# Patient Record
Sex: MALE | Race: BLACK OR AFRICAN AMERICAN | URBAN - METROPOLITAN AREA
[De-identification: names, ages, dates, MRNs, and addresses within clinical notes are randomized per-mention and may not be internally consistent; named-entity substitution may affect disease eponyms.]

---

## 2018-11-02 ENCOUNTER — OFFICE VISIT (OUTPATIENT)
Dept: FAMILY MEDICINE CLINIC | Age: 43
End: 2018-11-02

## 2018-11-02 VITALS
HEART RATE: 102 BPM | OXYGEN SATURATION: 99 % | WEIGHT: 280.6 LBS | BODY MASS INDEX: 38.01 KG/M2 | DIASTOLIC BLOOD PRESSURE: 90 MMHG | TEMPERATURE: 98.7 F | HEIGHT: 72 IN | RESPIRATION RATE: 19 BRPM | SYSTOLIC BLOOD PRESSURE: 130 MMHG

## 2018-11-02 DIAGNOSIS — M54.42 ACUTE LEFT-SIDED LOW BACK PAIN WITH LEFT-SIDED SCIATICA: Primary | ICD-10-CM

## 2018-11-02 RX ORDER — PREDNISONE 10 MG/1
TABLET ORAL
Qty: 21 TAB | Refills: 0 | Status: SHIPPED | OUTPATIENT
Start: 2018-11-02 | End: 2018-12-14 | Stop reason: ALTCHOICE

## 2018-11-02 RX ORDER — TRAMADOL HYDROCHLORIDE 50 MG/1
TABLET ORAL
Qty: 28 TAB | Refills: 0 | Status: SHIPPED | OUTPATIENT
Start: 2018-11-02 | End: 2018-12-14 | Stop reason: ALTCHOICE

## 2018-11-02 NOTE — PROGRESS NOTES
Chief Complaint   Patient presents with    Back Pain     back pain for the past three days. 1. Have you been to the ER, urgent care clinic since your last visit? Hospitalized since your last visit? No     2. Have you seen or consulted any other health care providers outside of the [address] [place] [address] since your last visit? Include any pap smears or colon screening.   No

## 2018-11-02 NOTE — LETTER
NOTIFICATION RETURN TO WORK / SCHOOL 
 
11/2/2018 11:25 AM 
 
Mr. Allyson Doyle [address] To Whom It May Concern: 
 
Allyson Doyle [facility] the [facility] He was here for an office visit 11/02/2018 If there are questions or concerns please have the patient contact our office. Sincerely, Lamin Souza NP

## 2018-11-02 NOTE — PROGRESS NOTES
Subjective:   Aminata Barrientos is a 37 y.o. male NEW patient presents to office today with 3 day complaint of back pain. Severity of pain 6-7/10  Location left lumbar   Context coughing three days ago after smoking a cigar and felt a pop lower back  Associated symptoms radiating left lower leg posterior to knee region   Triggers sitting and walking  Relieving factors rest   Review of Systems   Genitourinary: Negative. Musculoskeletal: Negative for falls, joint pain and neck pain. Neurological: Negative for tingling and focal weakness. PMH: reviewed medications and allergy lists and medical and family history. Occasional cigar smoker trying to quit  OBJECTIVE:  Awake and alert in no acute distress  Lungs clear throughout  S1 S2 RRR without ectopy or murmur auscultated. Musculoskeletal: point tenderness paraspinals lumbar left with positive straight leg bilaterally  Gait antalgic  Visit Vitals  /90 (BP 1 Location: Left arm, BP Patient Position: Sitting)   Pulse (!) 102   Temp 98.7 °F (37.1 °C) (Oral)   Resp 19   Ht 6' (1.829 m)   Wt 280 lb 9.6 oz (127.3 kg)   SpO2 99%   BMI 38.06 kg/m²     Diagnoses and all orders for this visit:    Acute left-sided low back pain with left-sided sciatica  -     predniSONE (STERAPRED DS) 10 mg dose pack; See administration instruction per 10mg dose pack, Normal, Disp-21 Tab, R-0  -     traMADol (ULTRAM) 50 mg tablet; May take 1 po every six hours for moderate to severe pain, Print, Disp-28 Tab, R-0      Reviewed risks and benefits and common side effects of new medication  General comfort measures  Anticipatory guidance  Health maintenance reviewed   I have discussed the diagnosis with the patient and the intended plan as seen in the above orders. The patient has received an after-visit summary and questions were answered concerning future plans. I have discussed medication side effects and warnings with the patient as well.     Follow-up Disposition:  Return for schedule Wellness .

## 2018-12-14 ENCOUNTER — OFFICE VISIT (OUTPATIENT)
Dept: FAMILY MEDICINE CLINIC | Age: 43
End: 2018-12-14

## 2018-12-14 VITALS
SYSTOLIC BLOOD PRESSURE: 127 MMHG | OXYGEN SATURATION: 100 % | HEART RATE: 62 BPM | BODY MASS INDEX: 38.6 KG/M2 | HEIGHT: 72 IN | TEMPERATURE: 97.6 F | DIASTOLIC BLOOD PRESSURE: 76 MMHG | WEIGHT: 285 LBS | RESPIRATION RATE: 20 BRPM

## 2018-12-14 DIAGNOSIS — Z13.220 SCREENING FOR HYPERCHOLESTEROLEMIA: ICD-10-CM

## 2018-12-14 DIAGNOSIS — Z13.1 SCREENING FOR DIABETES MELLITUS: ICD-10-CM

## 2018-12-14 DIAGNOSIS — Z00.00 GENERAL MEDICAL EXAM: Primary | ICD-10-CM

## 2018-12-14 DIAGNOSIS — Z12.5 SCREENING FOR PROSTATE CANCER: ICD-10-CM

## 2018-12-14 PROBLEM — E66.01 SEVERE OBESITY (HCC): Status: ACTIVE | Noted: 2018-12-14

## 2018-12-14 NOTE — PATIENT INSTRUCTIONS

## 2018-12-14 NOTE — PROGRESS NOTES
Chief Complaint   Patient presents with    Complete Physical    Shoulder Pain      x 2-3 weeks       1. Have you been to the ER, urgent care clinic since your last visit? Hospitalized since your last visit? No    2. Have you seen or consulted any other health care providers outside of the [address] [place] [address] since your last visit? Include any pap smears or colon screening.  No

## 2018-12-14 NOTE — PROGRESS NOTES
HPI   Jacob Dean is a 37 y.o. male who presents today for annual physical.  Pt is not currently on any prescriptive medication. Pt states he hasn't had any recent lab work done. History reviewed. No pertinent past medical history. Past Surgical History:   Procedure Laterality Date    HX KNEE ARTHROSCOPY Right     in 25s      No Known Allergies     Social History     Socioeconomic History    Marital status: UNKNOWN     Spouse name: Not on file    Number of children: Not on file    Years of education: Not on file    Highest education level: Not on file   Social Needs    Financial resource strain: Not on file    Food insecurity - worry: Not on file    Food insecurity - inability: Not on file   [employer] needs - medical: Not on file   [employer] needs - non-medical: Not on file   Occupational History    Occupation: mental health tech   Tobacco Use    Smoking status: Former Smoker     Types: Cigars    Smokeless tobacco: Never Used   Substance and Sexual Activity    Alcohol use: No    Drug use: Yes     Types: Marijuana    Sexual activity: Yes     Partners: Female     Birth control/protection: None   Other Topics Concern     Service Not Asked    Blood Transfusions Not Asked    Caffeine Concern Yes     Comment: twice a week    Occupational Exposure Not Asked    Hobby Hazards Not Asked    Sleep Concern Not Asked    Stress Concern Not Asked    Weight Concern Not Asked    Special Diet Not Asked    Back Care Not Asked    Exercise Yes     Comment: 2-3 days a week;  cardio; core; legs;    Bike Helmet Not Asked    Seat Belt Yes    Self-Exams Not Asked   Social History Narrative    Not on file        Family History   Problem Relation Age of Onset    Diabetes Mother     Diabetes Maternal Grandmother     Breast Cancer Paternal Grandmother      Review of Systems   Constitutional: Negative for chills, fever and malaise/fatigue.    HENT: Negative for congestion, ear pain and sinus pain. Eyes: Negative for blurred vision and pain. Respiratory: Negative for cough and shortness of breath. Cardiovascular: Negative for chest pain, palpitations and leg swelling. Gastrointestinal: Negative for abdominal pain, diarrhea, nausea and vomiting. Genitourinary: Negative for dysuria, frequency and urgency. Musculoskeletal: Negative for joint pain and myalgias. Skin: Negative for itching and rash. Neurological: Negative for dizziness, sensory change and headaches. Visit Vitals  /76 (BP 1 Location: Left arm, BP Patient Position: Sitting)   Pulse 62   Temp 97.6 °F (36.4 °C) (Oral)   Resp 20   Ht 6' (1.829 m)   Wt 285 lb (129.3 kg)   SpO2 100%   BMI 38.65 kg/m²     I have reviewed/discussed the above normal BMI with the patient. I have recommended the following interventions: dietary management education, guidance, and counseling, encourage exercise and monitor weight . [facility]   Constitutional: He is oriented to person, place, and time and well-developed, well-nourished, and in no distress. HENT:   Head: Normocephalic. Right Ear: Hearing, tympanic membrane, external ear and ear canal normal.   Left Ear: Hearing, tympanic membrane, external ear and ear canal normal.   Nose: No mucosal edema. Right sinus exhibits no maxillary sinus tenderness and no frontal sinus tenderness. Left sinus exhibits no maxillary sinus tenderness and no frontal sinus tenderness. Mouth/Throat: Uvula is midline. No posterior oropharyngeal edema or posterior oropharyngeal erythema. Eyes: Conjunctivae and EOM are normal. Pupils are equal, round, and reactive to light. Neck: Normal range of motion. Neck supple. No thyromegaly present. Cardiovascular: Normal rate, regular rhythm and normal heart sounds. Pulmonary/Chest: Effort normal and breath sounds normal. He has no wheezes. He has no rales. He exhibits no tenderness. Abdominal: Soft.  Bowel sounds are normal. He exhibits no distension. There is no tenderness. There is no guarding. Musculoskeletal: Normal range of motion. He exhibits no edema or tenderness. Lymphadenopathy:     He has no cervical adenopathy. Neurological: He is alert and oriented to person, place, and time. Gait normal.   Skin: Skin is warm and dry. Nursing note and vitals reviewed. ASSESSMENT/PLAN:  Diagnoses and all orders for this visit:    1. General medical exam  -     CBC WITH AUTOMATED DIFF; Future  -     METABOLIC PANEL, COMPREHENSIVE; Future    2. Screening for prostate cancer  -     PSA W/ REFLX FREE PSA; Future    3. Screening for diabetes mellitus  -     HEMOGLOBIN A1C WITH EAG; Future    4. Screening for hypercholesterolemia  -     LIPID PANEL;  Future      Follow-up Disposition:  Return in about 1 year (around 12/14/2019) for annual physical.    Reginaldo Snyder NP